# Patient Record
Sex: MALE | URBAN - METROPOLITAN AREA
[De-identification: names, ages, dates, MRNs, and addresses within clinical notes are randomized per-mention and may not be internally consistent; named-entity substitution may affect disease eponyms.]

---

## 2024-08-28 ENCOUNTER — OFFICE VISIT (OUTPATIENT)
Dept: URBAN - METROPOLITAN AREA CLINIC 35 | Facility: CLINIC | Age: 24
End: 2024-08-28

## 2024-08-28 ENCOUNTER — OFFICE VISIT (OUTPATIENT)
Dept: URBAN - METROPOLITAN AREA CLINIC 35 | Facility: CLINIC | Age: 24
End: 2024-08-28
Payer: COMMERCIAL

## 2024-08-28 ENCOUNTER — DASHBOARD ENCOUNTERS (OUTPATIENT)
Age: 24
End: 2024-08-28

## 2024-08-28 ENCOUNTER — TELEPHONE ENCOUNTER (OUTPATIENT)
Dept: URBAN - METROPOLITAN AREA CLINIC 35 | Facility: CLINIC | Age: 24
End: 2024-08-28

## 2024-08-28 VITALS
HEIGHT: 68 IN | HEART RATE: 80 BPM | DIASTOLIC BLOOD PRESSURE: 74 MMHG | WEIGHT: 165 LBS | SYSTOLIC BLOOD PRESSURE: 112 MMHG | BODY MASS INDEX: 25.01 KG/M2 | OXYGEN SATURATION: 99 %

## 2024-08-28 DIAGNOSIS — K21.9 GASTROESOPHAGEAL REFLUX DISEASE, UNSPECIFIED WHETHER ESOPHAGITIS PRESENT: ICD-10-CM

## 2024-08-28 PROBLEM — 235595009: Status: ACTIVE | Noted: 2024-08-28

## 2024-08-28 PROBLEM — 79922009: Status: ACTIVE | Noted: 2024-08-28

## 2024-08-28 PROCEDURE — 99203 OFFICE O/P NEW LOW 30 MIN: CPT | Performed by: HOSPITALIST

## 2024-08-28 RX ORDER — OMEPRAZOLE 20 MG/1
1 CAPSULE 30 MINUTES BEFORE MORNING MEAL CAPSULE, DELAYED RELEASE ORAL
Status: ACTIVE | COMMUNITY
Start: 2024-08-28

## 2024-08-28 NOTE — HPI-TODAY'S VISIT:
23-year-old male with no significant past medical history presents to the gastroenterology clinic due to complaints of heartburn, acid regurgitation for the past few weeks.  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  He is in [place] for the last 1 month.  He reports eating outside for the past 1 month.  He reports having significant heartburn, epigastric pain, acid regurgitation, sore taste in the mouth as well as persistent belching and burping.  He also reports intermittent lower abdominal cramping.  He reports having mild constipation.  Denies any blood in the stool, black tarry stool.  He also reports nocturnal symptoms with nausea and acid regurgitation but denies any vomiting.  Never had any endoscopy in the past.  He recently started omeprazole 20 mg daily for the past 3 days along with using Pepto-Bismol which has helped with his symptoms significantly at this time.  He also stopped eating outside and has been cooking food at home with bland diet which has been helping.  No family history of GI cancers except esophageal cancer and secondary relative